# Patient Record
Sex: MALE | Race: WHITE | Employment: OTHER | URBAN - METROPOLITAN AREA
[De-identification: names, ages, dates, MRNs, and addresses within clinical notes are randomized per-mention and may not be internally consistent; named-entity substitution may affect disease eponyms.]

---

## 2017-01-10 ENCOUNTER — OFFICE VISIT (OUTPATIENT)
Dept: PODIATRY CLINIC | Facility: CLINIC | Age: 77
End: 2017-01-10

## 2017-01-10 DIAGNOSIS — M06.30 RHEUMATOID NODULE (HCC): Primary | ICD-10-CM

## 2017-01-10 PROCEDURE — 99203 OFFICE O/P NEW LOW 30 MIN: CPT

## 2017-01-10 PROCEDURE — 20600 DRAIN/INJ JOINT/BURSA W/O US: CPT

## 2017-01-10 RX ORDER — BETAMETHASONE SODIUM PHOSPHATE AND BETAMETHASONE ACETATE 3; 3 MG/ML; MG/ML
12 INJECTION, SUSPENSION INTRA-ARTICULAR; INTRALESIONAL; INTRAMUSCULAR; SOFT TISSUE ONCE
Status: COMPLETED | OUTPATIENT
Start: 2017-01-10 | End: 2017-01-10

## 2017-01-10 RX ADMIN — BETAMETHASONE SODIUM PHOSPHATE AND BETAMETHASONE ACETATE 12 MG: 3; 3 INJECTION, SUSPENSION INTRA-ARTICULAR; INTRALESIONAL; INTRAMUSCULAR; SOFT TISSUE at 14:50:00

## 2017-01-10 NOTE — PROGRESS NOTES
Preparred as ordered by Dr. Melonie Camacho. 2 cc's 2 % lidocaine and 1 cc of celestone. Consent signed by patient. Medication administration per Dr. Melonie Camacho.  Leila Pool RN

## 2017-01-10 NOTE — PROGRESS NOTES
HPI:    Patient ID: Ingris Hansen is a 64year old male. The patient has come to the office with a c/o toe pain. He indicates that he was seen by us 7 years ago. HPI     1. Left 2nd great toe pain  This is a recurrent problem.  The current episode s Use: Yes                Comment: erma               Current Outpatient Prescriptions:  Cefdinir 300 MG Oral Cap Take 1 capsule (300 mg total) by mouth 2 (two) times daily.  Disp: 20 capsule Rfl: 0   Fluticasone Furoate-Vilanterol (BREO ELLIPTA) 100-25 MCG Constitutional: He appears well-developed and well-nourished. No distress. HENT:   Head: Normocephalic and atraumatic. Eyes: EOM are normal.   Neck: Normal range of motion. Pulmonary/Chest: Effort normal. No respiratory distress.    Musculoskeletal:

## 2017-01-13 ENCOUNTER — LAB ENCOUNTER (OUTPATIENT)
Dept: LAB | Age: 77
End: 2017-01-13
Attending: INTERNAL MEDICINE
Payer: MEDICARE

## 2017-01-13 ENCOUNTER — OFFICE VISIT (OUTPATIENT)
Dept: RHEUMATOLOGY | Facility: CLINIC | Age: 77
End: 2017-01-13

## 2017-01-13 VITALS
WEIGHT: 139.38 LBS | SYSTOLIC BLOOD PRESSURE: 128 MMHG | DIASTOLIC BLOOD PRESSURE: 69 MMHG | HEIGHT: 66 IN | BODY MASS INDEX: 22.4 KG/M2

## 2017-01-13 DIAGNOSIS — M06.09 RHEUMATOID ARTHRITIS OF MULTIPLE SITES WITH NEGATIVE RHEUMATOID FACTOR (HCC): ICD-10-CM

## 2017-01-13 DIAGNOSIS — Z51.81 ENCOUNTER FOR THERAPEUTIC DRUG MONITORING: ICD-10-CM

## 2017-01-13 DIAGNOSIS — M05.79 SEROPOSITIVE RHEUMATOID ARTHRITIS OF MULTIPLE SITES (HCC): ICD-10-CM

## 2017-01-13 DIAGNOSIS — J84.10 PULMONARY FIBROSIS (HCC): ICD-10-CM

## 2017-01-13 DIAGNOSIS — M31.0 LEUKOCYTOCLASTIC VASCULITIS (HCC): ICD-10-CM

## 2017-01-13 DIAGNOSIS — M06.09 RHEUMATOID ARTHRITIS OF MULTIPLE SITES WITH NEGATIVE RHEUMATOID FACTOR (HCC): Primary | ICD-10-CM

## 2017-01-13 LAB
ALBUMIN SERPL BCP-MCNC: 4 G/DL (ref 3.5–4.8)
AST SERPL-CCNC: 26 U/L (ref 15–41)
BASOPHILS # BLD: 0.1 K/UL (ref 0–0.2)
BASOPHILS NFR BLD: 1 %
CREAT SERPL-MCNC: 1.49 MG/DL (ref 0.5–1.5)
CRP SERPL-MCNC: 0.8 MG/DL (ref 0–0.9)
EOSINOPHIL # BLD: 0.2 K/UL (ref 0–0.7)
EOSINOPHIL NFR BLD: 2 %
ERYTHROCYTE [DISTWIDTH] IN BLOOD BY AUTOMATED COUNT: 16.3 % (ref 11–15)
ERYTHROCYTE [SEDIMENTATION RATE] IN BLOOD: 20 MM/HR (ref 0–20)
HCT VFR BLD AUTO: 37.9 % (ref 41–52)
HGB BLD-MCNC: 12.2 G/DL (ref 13.5–17.5)
LYMPHOCYTES # BLD: 1 K/UL (ref 1–4)
LYMPHOCYTES NFR BLD: 9 %
MCH RBC QN AUTO: 30.5 PG (ref 27–32)
MCHC RBC AUTO-ENTMCNC: 32.2 G/DL (ref 32–37)
MCV RBC AUTO: 94.7 FL (ref 80–100)
MONOCYTES # BLD: 1 K/UL (ref 0–1)
MONOCYTES NFR BLD: 10 %
NEUTROPHILS # BLD AUTO: 8.1 K/UL (ref 1.8–7.7)
NEUTROPHILS NFR BLD: 78 %
PLATELET # BLD AUTO: 198 K/UL (ref 140–400)
PMV BLD AUTO: 8.3 FL (ref 7.4–10.3)
RBC # BLD AUTO: 4.01 M/UL (ref 4.5–5.9)
WBC # BLD AUTO: 10.3 K/UL (ref 4–11)

## 2017-01-13 PROCEDURE — 82040 ASSAY OF SERUM ALBUMIN: CPT

## 2017-01-13 PROCEDURE — 85025 COMPLETE CBC W/AUTO DIFF WBC: CPT

## 2017-01-13 PROCEDURE — 84450 TRANSFERASE (AST) (SGOT): CPT

## 2017-01-13 PROCEDURE — 99214 OFFICE O/P EST MOD 30 MIN: CPT | Performed by: INTERNAL MEDICINE

## 2017-01-13 PROCEDURE — 82565 ASSAY OF CREATININE: CPT

## 2017-01-13 PROCEDURE — 85652 RBC SED RATE AUTOMATED: CPT

## 2017-01-13 PROCEDURE — G0463 HOSPITAL OUTPT CLINIC VISIT: HCPCS | Performed by: INTERNAL MEDICINE

## 2017-01-13 PROCEDURE — 86140 C-REACTIVE PROTEIN: CPT

## 2017-01-13 PROCEDURE — 36415 COLL VENOUS BLD VENIPUNCTURE: CPT

## 2017-01-13 RX ORDER — HYDROCODONE BITARTRATE AND ACETAMINOPHEN 5; 325 MG/1; MG/1
1 TABLET ORAL EVERY 6 HOURS PRN
Qty: 90 TABLET | Refills: 0 | Status: SHIPPED | OUTPATIENT
Start: 2017-01-13 | End: 2017-05-05

## 2017-01-13 NOTE — PROGRESS NOTES
HPI:    Patient ID: Flako Ortega is a 64year old male.     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 Disp: 180 tablet Rfl: 3   Albuterol Sulfate  (90 BASE) MCG/ACT Inhalation Aero Soln Inhale 2 puffs into the lungs every 6 (six) hours as needed for Wheezing.  Disp: 1 Inhaler Rfl: 2   Metoprolol Tartrate (LOPRESSOR) 50 MG O both knees without effusions. Lymphadenopathy:     He has no cervical adenopathy. Neurological: He is alert and oriented to person, place, and time. Skin: No rash noted. Psychiatric: He has a normal mood and affect.               ASSESSMENT/PLAN:

## 2017-01-17 ENCOUNTER — OFFICE VISIT (OUTPATIENT)
Dept: PODIATRY CLINIC | Facility: CLINIC | Age: 77
End: 2017-01-17

## 2017-01-17 DIAGNOSIS — M06.30 RHEUMATOID NODULE (HCC): Primary | ICD-10-CM

## 2017-01-17 PROCEDURE — 99213 OFFICE O/P EST LOW 20 MIN: CPT

## 2017-01-17 NOTE — PROGRESS NOTES
HPI:    Patient ID: Ingris Hansen is a 64year old male. HPI     1. Left 2nd great toe pain  This is a recurrent problem. The current episode started more than 1 year ago. The problem occurs intermittently. The problem has been gradually worsening.  Per Outpatient Prescriptions:  HYDROcodone-acetaminophen 5-325 MG Oral Tab Take 1 tablet by mouth every 6 (six) hours as needed for Pain. Disp: 90 tablet Rfl: 0   Cefdinir 300 MG Oral Cap Take 1 capsule (300 mg total) by mouth 2 (two) times daily.  Disp: 20 cap well-nourished. No distress. HENT:   Head: Normocephalic and atraumatic. Eyes: EOM are normal.   Neck: Normal range of motion. Pulmonary/Chest: Effort normal. No respiratory distress. Musculoskeletal: Normal range of motion.    Left 2nd great toe -

## 2017-02-14 ENCOUNTER — OFFICE VISIT (OUTPATIENT)
Dept: CARDIOLOGY CLINIC | Facility: CLINIC | Age: 77
End: 2017-02-14

## 2017-02-14 VITALS
DIASTOLIC BLOOD PRESSURE: 58 MMHG | WEIGHT: 140 LBS | HEIGHT: 65 IN | BODY MASS INDEX: 23.32 KG/M2 | SYSTOLIC BLOOD PRESSURE: 110 MMHG | RESPIRATION RATE: 18 BRPM | HEART RATE: 75 BPM

## 2017-02-14 DIAGNOSIS — I25.10 CORONARY ARTERY DISEASE INVOLVING NATIVE CORONARY ARTERY OF NATIVE HEART WITHOUT ANGINA PECTORIS: Primary | ICD-10-CM

## 2017-02-14 DIAGNOSIS — E78.00 HYPERCHOLESTEREMIA: ICD-10-CM

## 2017-02-14 DIAGNOSIS — I10 HTN (HYPERTENSION), BENIGN: ICD-10-CM

## 2017-02-14 PROCEDURE — G0463 HOSPITAL OUTPT CLINIC VISIT: HCPCS | Performed by: INTERNAL MEDICINE

## 2017-02-14 PROCEDURE — 99214 OFFICE O/P EST MOD 30 MIN: CPT | Performed by: INTERNAL MEDICINE

## 2017-02-14 RX ORDER — LISINOPRIL 10 MG/1
10 TABLET ORAL DAILY
Qty: 90 TABLET | Refills: 3 | Status: SHIPPED | OUTPATIENT
Start: 2017-02-14 | End: 2017-12-04

## 2017-02-14 RX ORDER — LOVASTATIN 20 MG/1
20 TABLET ORAL NIGHTLY
Qty: 90 TABLET | Refills: 3 | Status: SHIPPED | OUTPATIENT
Start: 2017-02-14

## 2017-02-14 RX ORDER — METOPROLOL TARTRATE 50 MG/1
50 TABLET, FILM COATED ORAL 2 TIMES DAILY
Qty: 180 TABLET | Refills: 3 | Status: SHIPPED | OUTPATIENT
Start: 2017-02-14 | End: 2017-12-04

## 2017-02-14 NOTE — PROGRESS NOTES
Amado Gale is a 64year old male. Patient presents with:   Follow - Up  CAD  Hypercholesterolemia    HPI:   This is a pleasant 41-year-old with coronary disease prior stent in 2006 hypertension elevated cholesterol arthritis and recent diagnosis of pulmo nightly. Disp: 90 tablet Rfl: 3   Probiotic Product (SOLUBLE FIBER/PROBIOTICS OR) Take 1 tablet by mouth daily. Disp:  Rfl:    Ascorbic Acid (VITAMIN C OR) Take 1 tablet by mouth daily.    Disp:  Rfl:    Calcium Carbonate (CALCIUM 500 OR) Take 1 tablet by Addressed This Visit     CAD (coronary artery disease) - Primary    Relevant Orders    ALT (SGPT)    AST (SGOT)    LIPID PANEL    Hypercholesteremia    HTN (hypertension), benign          In conclusion this is a pleasant 40-year-old with coronary disease h

## 2017-03-21 ENCOUNTER — OFFICE VISIT (OUTPATIENT)
Dept: PULMONOLOGY | Facility: CLINIC | Age: 77
End: 2017-03-21

## 2017-03-21 VITALS
WEIGHT: 139.38 LBS | HEIGHT: 66 IN | SYSTOLIC BLOOD PRESSURE: 117 MMHG | BODY MASS INDEX: 22.4 KG/M2 | HEART RATE: 78 BPM | OXYGEN SATURATION: 97 % | DIASTOLIC BLOOD PRESSURE: 67 MMHG | RESPIRATION RATE: 18 BRPM

## 2017-03-21 DIAGNOSIS — J84.112 UIP (USUAL INTERSTITIAL PNEUMONITIS) (HCC): Primary | ICD-10-CM

## 2017-03-21 PROCEDURE — 99213 OFFICE O/P EST LOW 20 MIN: CPT | Performed by: INTERNAL MEDICINE

## 2017-03-21 PROCEDURE — G0463 HOSPITAL OUTPT CLINIC VISIT: HCPCS | Performed by: INTERNAL MEDICINE

## 2017-03-21 NOTE — PROGRESS NOTES
Referring Physician  Abhay Hinkle MD    History of Present Illness  Patient is a 77-year-old  male who presents to pulmonary clinic for follow-up visit.   Over the course the last 6 months he states that his dyspnea with exertion is actually i daily Disp: 90 tablet Rfl: 3   Probiotic Product (SOLUBLE FIBER/PROBIOTICS OR) Take 1 tablet by mouth daily. Disp:  Rfl:    Ascorbic Acid (VITAMIN C OR) Take 1 tablet by mouth daily.    Disp:  Rfl:    Calcium Carbonate (CALCIUM 500 OR) Take 1 tablet by mo monitor the patient periodically.   -I informed the patient to call our office if his dyspnea worsens or if he develops worsening productive cough with purulent sputum, fevers as I would have a low threshold for aggressively treating him with antibiotic th

## 2017-03-22 RX ORDER — METHOTREXATE 2.5 MG/1
TABLET ORAL
Qty: 32 TABLET | Refills: 5 | Status: SHIPPED | OUTPATIENT
Start: 2017-03-22 | End: 2017-08-11

## 2017-03-22 NOTE — TELEPHONE ENCOUNTER
LOV: 1/13/2017  Future Appointments  Date Time Provider Nette Irwin   5/5/2017 11:00 AM Patti Brewster MD [place]   9/19/2017 11:10 AM Yane Sanchez [facility]     Labs:   Component      Latest Ref Rng 1/13/2017   WBC      4.0-11.0 K/UL 10.3   RBC      4.50-5.90 M/UL 4.01 (L)   Hemoglobin      13.5-17.5 g/dL 12.2 (L)   Hematocrit      41.0-52.0 % 37.9 (L)   MCV      80.0-100.0 fL 94.7   MCH      27.0-32.0 pg 30.5   MCHC      32.0-37.0 g/dl 32.2   RDW      11.0-15.0 % 16.3 (H)   Platelet Count      416-277 K/   MEAN PLATELET VOLUME      7.4-10.3 fL 8.3   Neutrophils %       78   Lymphocytes %       9   Monocytes %       10   Eosinophils %       2   Basophils %       1   Neutrophils Absolute      1.8-7.7 K/UL 8.1 (H)   Lymphocytes Absolute      1.0-4.0 K/UL 1.0   Monocytes Absolute      0.0-1.0 K/UL 1.0   Eosinophils Absolute      0.0-0.7 K/UL 0.2   Basophils Absolute      0.0-0.2 K/UL 0.1   CREATININE      0.50-1.50 mg/dL 1.49   GFR, Non-      >=60 46 (L)   GFR, -American      >=60 55 (L)   Albumin      3.5-4.8 g/dL 4.0   AST (SGOT)      15-41 U/L 26   C-REACTIVE PROTEIN      0.0-0.9 mg/dL 0.8   SED RATE      0-20 mm/Hr 20

## 2017-04-20 ENCOUNTER — APPOINTMENT (OUTPATIENT)
Dept: LAB | Age: 77
End: 2017-04-20
Attending: INTERNAL MEDICINE
Payer: MEDICARE

## 2017-04-20 ENCOUNTER — OFFICE VISIT (OUTPATIENT)
Dept: FAMILY MEDICINE CLINIC | Facility: CLINIC | Age: 77
End: 2017-04-20

## 2017-04-20 ENCOUNTER — TELEPHONE (OUTPATIENT)
Dept: FAMILY MEDICINE CLINIC | Facility: CLINIC | Age: 77
End: 2017-04-20

## 2017-04-20 VITALS
WEIGHT: 134 LBS | OXYGEN SATURATION: 97 % | BODY MASS INDEX: 22 KG/M2 | HEART RATE: 92 BPM | SYSTOLIC BLOOD PRESSURE: 116 MMHG | TEMPERATURE: 97 F | RESPIRATION RATE: 18 BRPM | DIASTOLIC BLOOD PRESSURE: 71 MMHG

## 2017-04-20 DIAGNOSIS — I25.10 CORONARY ARTERY DISEASE INVOLVING NATIVE CORONARY ARTERY OF NATIVE HEART WITHOUT ANGINA PECTORIS: ICD-10-CM

## 2017-04-20 DIAGNOSIS — D84.9 IMMUNOCOMPROMISED PATIENT (HCC): Primary | ICD-10-CM

## 2017-04-20 DIAGNOSIS — J84.10 PULMONARY FIBROSIS (HCC): ICD-10-CM

## 2017-04-20 DIAGNOSIS — J01.01 ACUTE RECURRENT MAXILLARY SINUSITIS: ICD-10-CM

## 2017-04-20 PROCEDURE — 84450 TRANSFERASE (AST) (SGOT): CPT

## 2017-04-20 PROCEDURE — G0463 HOSPITAL OUTPT CLINIC VISIT: HCPCS | Performed by: FAMILY MEDICINE

## 2017-04-20 PROCEDURE — 99213 OFFICE O/P EST LOW 20 MIN: CPT | Performed by: FAMILY MEDICINE

## 2017-04-20 PROCEDURE — 36415 COLL VENOUS BLD VENIPUNCTURE: CPT

## 2017-04-20 PROCEDURE — 80061 LIPID PANEL: CPT

## 2017-04-20 PROCEDURE — 84460 ALANINE AMINO (ALT) (SGPT): CPT

## 2017-04-20 RX ORDER — AMOXICILLIN AND CLAVULANATE POTASSIUM 875; 125 MG/1; MG/1
1 TABLET, FILM COATED ORAL 2 TIMES DAILY
Qty: 20 TABLET | Refills: 0 | Status: SHIPPED | OUTPATIENT
Start: 2017-04-20 | End: 2017-11-03

## 2017-04-20 NOTE — PROGRESS NOTES
HPI:    Patient ID: Ruel Ours is a 76year old male. Patient presents with:  Sinus Problem  Cough  Breathing Problem    HPI  Long hx recurrent sinusitis, was worse in past  Is immunocompromised due to Rx for RA incl methotrexate and prednisone.   Celestino FIBER/PROBIOTICS OR) Take 1 tablet by mouth daily. Disp:  Rfl:    Ascorbic Acid (VITAMIN C OR) Take 1 tablet by mouth daily. Disp:  Rfl:    Calcium Carbonate (CALCIUM 500 OR) Take 1 tablet by mouth daily.    Disp:  Rfl:    Biotin (BIOTIN 5000) 5 MG Oral EMILEE#[id number]

## 2017-04-20 NOTE — TELEPHONE ENCOUNTER
Reviewed pharmacist's question with Dr. Jyothi Perez. Per his instructions contacted pharmacist, Jackelyn Velasco, and advised her pt should not take Methotrexate for 10 days while he is taking the Augmentin, she will inform pt.

## 2017-04-20 NOTE — TELEPHONE ENCOUNTER
Call transferred to RN from [address] [place] [address]. Pharmacist states there is a drug interaction between Augmentin prescribed today and Methotrexate prescribed by another physician. Pharmacist asking if ok to dispense med. Call back number [phone]. Please advise.

## 2017-04-20 NOTE — PROGRESS NOTES
HPI:    Patient ID: Silvia Gaona is a 76year old male.     HPI    Review of Systems         Current Outpatient Prescriptions:  METHOTREXATE 2.5 MG Oral Tab TAKE EIGHT TABLETS BY MOUTH EVERY WEDNESDAY Disp: 32 tablet Rfl: 5   Fluticasone Furoate-Vilantero Rfl:      Allergies:No Known Allergies   PHYSICAL EXAM:   Physical Exam           ASSESSMENT/PLAN:   No diagnosis found. No orders of the defined types were placed in this encounter.        Meds This Visit:  No prescriptions requested or ordered in this

## 2017-05-05 ENCOUNTER — LAB ENCOUNTER (OUTPATIENT)
Dept: LAB | Age: 77
End: 2017-05-05
Attending: INTERNAL MEDICINE
Payer: MEDICARE

## 2017-05-05 ENCOUNTER — OFFICE VISIT (OUTPATIENT)
Dept: RHEUMATOLOGY | Facility: CLINIC | Age: 77
End: 2017-05-05

## 2017-05-05 VITALS
DIASTOLIC BLOOD PRESSURE: 63 MMHG | HEIGHT: 66 IN | SYSTOLIC BLOOD PRESSURE: 105 MMHG | BODY MASS INDEX: 21.69 KG/M2 | TEMPERATURE: 98 F | WEIGHT: 135 LBS | HEART RATE: 82 BPM

## 2017-05-05 DIAGNOSIS — Z51.81 ENCOUNTER FOR THERAPEUTIC DRUG MONITORING: ICD-10-CM

## 2017-05-05 DIAGNOSIS — M05.79 SEROPOSITIVE RHEUMATOID ARTHRITIS OF MULTIPLE SITES (HCC): Primary | ICD-10-CM

## 2017-05-05 DIAGNOSIS — J84.10 PULMONARY FIBROSIS (HCC): ICD-10-CM

## 2017-05-05 DIAGNOSIS — M06.09 RHEUMATOID ARTHRITIS OF MULTIPLE SITES WITH NEGATIVE RHEUMATOID FACTOR (HCC): ICD-10-CM

## 2017-05-05 DIAGNOSIS — M31.0 LEUKOCYTOCLASTIC VASCULITIS (HCC): ICD-10-CM

## 2017-05-05 DIAGNOSIS — N28.9 RENAL INSUFFICIENCY: ICD-10-CM

## 2017-05-05 PROCEDURE — 86140 C-REACTIVE PROTEIN: CPT

## 2017-05-05 PROCEDURE — G0463 HOSPITAL OUTPT CLINIC VISIT: HCPCS | Performed by: INTERNAL MEDICINE

## 2017-05-05 PROCEDURE — 84450 TRANSFERASE (AST) (SGOT): CPT

## 2017-05-05 PROCEDURE — 85007 BL SMEAR W/DIFF WBC COUNT: CPT

## 2017-05-05 PROCEDURE — 85652 RBC SED RATE AUTOMATED: CPT

## 2017-05-05 PROCEDURE — 82040 ASSAY OF SERUM ALBUMIN: CPT

## 2017-05-05 PROCEDURE — 82565 ASSAY OF CREATININE: CPT

## 2017-05-05 PROCEDURE — 36415 COLL VENOUS BLD VENIPUNCTURE: CPT

## 2017-05-05 PROCEDURE — 85027 COMPLETE CBC AUTOMATED: CPT

## 2017-05-05 PROCEDURE — 99214 OFFICE O/P EST MOD 30 MIN: CPT | Performed by: INTERNAL MEDICINE

## 2017-05-05 RX ORDER — HYDROCODONE BITARTRATE AND ACETAMINOPHEN 5; 325 MG/1; MG/1
1 TABLET ORAL EVERY 6 HOURS PRN
Qty: 90 TABLET | Refills: 0 | Status: SHIPPED | OUTPATIENT
Start: 2017-05-05 | End: 2017-08-11

## 2017-05-05 RX ORDER — PREDNISONE 1 MG/1
TABLET ORAL
Qty: 90 TABLET | Refills: 3 | Status: SHIPPED | OUTPATIENT
Start: 2017-05-05 | End: 2017-12-04

## 2017-05-05 NOTE — PROGRESS NOTES
HPI:    Patient ID: Cyrus Ward is a 76year old male.     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 Disp: 1 each Rfl: 5   lisinopril 10 MG Oral Tab Take 1 tablet (10 mg total) by mouth daily.  Disp: 90 tablet Rfl: 3   Lovastatin 20 MG Oral Tab Take 1 tablet (20 mg total) by mouth nightly exhibits no edema. There is not active synovitis in his elbows, wrists, or hands. There is crepitance and discomfort with flexion and extension of his knees, without effusions. Lymphadenopathy:     He has no cervical adenopathy.    Neurological: He is

## 2017-05-30 RX ORDER — VALACYCLOVIR HYDROCHLORIDE 500 MG/1
TABLET, FILM COATED ORAL
Qty: 90 TABLET | Refills: 3 | Status: SHIPPED | OUTPATIENT
Start: 2017-05-30

## 2017-05-30 NOTE — TELEPHONE ENCOUNTER
Dr. Jessie Kate: please review for additional refills.    Refill Protocol Appointment Criteria  · Appointment scheduled in the past 12 months or in the next 3 months  Recent Visits       Provider Department Primary Dx    1 month ago MD Sallie Ibarra

## 2017-08-11 ENCOUNTER — LAB ENCOUNTER (OUTPATIENT)
Dept: LAB | Age: 77
End: 2017-08-11
Attending: INTERNAL MEDICINE
Payer: MEDICARE

## 2017-08-11 ENCOUNTER — OFFICE VISIT (OUTPATIENT)
Dept: RHEUMATOLOGY | Facility: CLINIC | Age: 77
End: 2017-08-11

## 2017-08-11 VITALS
DIASTOLIC BLOOD PRESSURE: 66 MMHG | BODY MASS INDEX: 21.44 KG/M2 | HEART RATE: 83 BPM | WEIGHT: 133.38 LBS | HEIGHT: 66 IN | SYSTOLIC BLOOD PRESSURE: 106 MMHG

## 2017-08-11 DIAGNOSIS — M31.0 LEUKOCYTOCLASTIC VASCULITIS (HCC): ICD-10-CM

## 2017-08-11 DIAGNOSIS — J84.10 PULMONARY FIBROSIS (HCC): ICD-10-CM

## 2017-08-11 DIAGNOSIS — Z51.81 ENCOUNTER FOR THERAPEUTIC DRUG MONITORING: ICD-10-CM

## 2017-08-11 DIAGNOSIS — M05.79 SEROPOSITIVE RHEUMATOID ARTHRITIS OF MULTIPLE SITES (HCC): Primary | ICD-10-CM

## 2017-08-11 DIAGNOSIS — M06.09 RHEUMATOID ARTHRITIS OF MULTIPLE SITES WITH NEGATIVE RHEUMATOID FACTOR (HCC): ICD-10-CM

## 2017-08-11 LAB
ALBUMIN SERPL BCP-MCNC: 3.8 G/DL (ref 3.5–4.8)
AST SERPL-CCNC: 26 U/L (ref 15–41)
BASOPHILS # BLD: 0.1 K/UL (ref 0–0.2)
BASOPHILS NFR BLD: 1 %
CREAT SERPL-MCNC: 1.76 MG/DL (ref 0.5–1.5)
CRP SERPL-MCNC: 7.6 MG/DL (ref 0–0.9)
EOSINOPHIL # BLD: 0.1 K/UL (ref 0–0.7)
EOSINOPHIL NFR BLD: 1 %
ERYTHROCYTE [DISTWIDTH] IN BLOOD BY AUTOMATED COUNT: 17.3 % (ref 11–15)
ERYTHROCYTE [SEDIMENTATION RATE] IN BLOOD: 45 MM/HR (ref 0–20)
HCT VFR BLD AUTO: 34.9 % (ref 41–52)
HGB BLD-MCNC: 11.1 G/DL (ref 13.5–17.5)
LYMPHOCYTES # BLD: 1 K/UL (ref 1–4)
LYMPHOCYTES NFR BLD: 8 %
MCH RBC QN AUTO: 29.5 PG (ref 27–32)
MCHC RBC AUTO-ENTMCNC: 31.7 G/DL (ref 32–37)
MCV RBC AUTO: 93 FL (ref 80–100)
MONOCYTES # BLD: 1.1 K/UL (ref 0–1)
MONOCYTES NFR BLD: 9 %
NEUTROPHILS # BLD AUTO: 9.7 K/UL (ref 1.8–7.7)
NEUTROPHILS NFR BLD: 81 %
PLATELET # BLD AUTO: 225 K/UL (ref 140–400)
PMV BLD AUTO: 7.9 FL (ref 7.4–10.3)
RBC # BLD AUTO: 3.75 M/UL (ref 4.5–5.9)
WBC # BLD AUTO: 11.9 K/UL (ref 4–11)

## 2017-08-11 PROCEDURE — 84450 TRANSFERASE (AST) (SGOT): CPT

## 2017-08-11 PROCEDURE — 99214 OFFICE O/P EST MOD 30 MIN: CPT | Performed by: INTERNAL MEDICINE

## 2017-08-11 PROCEDURE — 86140 C-REACTIVE PROTEIN: CPT

## 2017-08-11 PROCEDURE — 82565 ASSAY OF CREATININE: CPT

## 2017-08-11 PROCEDURE — 85652 RBC SED RATE AUTOMATED: CPT

## 2017-08-11 PROCEDURE — 36415 COLL VENOUS BLD VENIPUNCTURE: CPT

## 2017-08-11 PROCEDURE — 82040 ASSAY OF SERUM ALBUMIN: CPT

## 2017-08-11 PROCEDURE — 85025 COMPLETE CBC W/AUTO DIFF WBC: CPT

## 2017-08-11 PROCEDURE — G0463 HOSPITAL OUTPT CLINIC VISIT: HCPCS | Performed by: INTERNAL MEDICINE

## 2017-08-11 RX ORDER — HYDROXYCHLOROQUINE SULFATE 200 MG/1
200 TABLET, FILM COATED ORAL 2 TIMES DAILY
Qty: 180 TABLET | Refills: 3 | Status: SHIPPED | OUTPATIENT
Start: 2017-08-11

## 2017-08-11 RX ORDER — HYDROCODONE BITARTRATE AND ACETAMINOPHEN 5; 325 MG/1; MG/1
1 TABLET ORAL EVERY 6 HOURS PRN
Qty: 90 TABLET | Refills: 0 | Status: SHIPPED | OUTPATIENT
Start: 2017-08-11 | End: 2017-11-03

## 2017-08-11 NOTE — PROGRESS NOTES
HPI:    Patient ID: Jose Reyes is a 76year old male. 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  Since I saw Furoate-Vilanterol (BREO ELLIPTA) 100-25 MCG/INH Inhalation Aerosol Powder, Breath Activated Inhale 1 puff into the lungs daily. Disp: 1 each Rfl: 5   lisinopril 10 MG Oral Tab Take 1 tablet (10 mg total) by mouth daily.  Disp: 90 tablet Rfl: 3   Lovastatin wrists, hands, or knees. Lymphadenopathy:     He has no cervical adenopathy. Neurological: He is alert and oriented to person, place, and time. Skin: No rash noted. Psychiatric: He has a normal mood and affect. ASSESSMENT/PLAN:     1.

## 2017-09-19 ENCOUNTER — OFFICE VISIT (OUTPATIENT)
Dept: PULMONOLOGY | Facility: CLINIC | Age: 77
End: 2017-09-19

## 2017-09-19 VITALS
HEIGHT: 67 IN | RESPIRATION RATE: 18 BRPM | DIASTOLIC BLOOD PRESSURE: 60 MMHG | SYSTOLIC BLOOD PRESSURE: 112 MMHG | BODY MASS INDEX: 20.09 KG/M2 | WEIGHT: 128 LBS | HEART RATE: 91 BPM | OXYGEN SATURATION: 98 %

## 2017-09-19 DIAGNOSIS — J84.9 ILD (INTERSTITIAL LUNG DISEASE) (HCC): Primary | ICD-10-CM

## 2017-09-19 PROCEDURE — G0463 HOSPITAL OUTPT CLINIC VISIT: HCPCS | Performed by: INTERNAL MEDICINE

## 2017-09-19 PROCEDURE — 99213 OFFICE O/P EST LOW 20 MIN: CPT | Performed by: INTERNAL MEDICINE

## 2017-09-19 NOTE — PROGRESS NOTES
Referring Physician  Vivian Cortez MD    History of Present Illness  Patient is a 64-year-old  male who presents to pulmonary clinic for follow-up visit. Over the course of the last several months he denies any significant worsening dyspnea.   He do 2   aspirin 325 MG Oral Tab Take 325 mg by mouth daily. Disp:  Rfl:    Probiotic Product (SOLUBLE FIBER/PROBIOTICS OR) Take 1 tablet by mouth daily. Disp:  Rfl:    Ascorbic Acid (VITAMIN C OR) Take 1 tablet by mouth daily.    Disp:  Rfl:    Calcium Carbon therapy.  -Patient states that he will attempt to have his lisinopril changed to other class of medication if it may be contributing to his nonproductive cough. I recommended to start PPI therapy but he states he will hold off at this time.     Follow Up

## 2017-10-13 ENCOUNTER — HOSPITAL ENCOUNTER (OUTPATIENT)
Dept: CT IMAGING | Facility: HOSPITAL | Age: 77
Discharge: HOME | End: 2017-10-13
Attending: INTERNAL MEDICINE
Payer: MEDICARE

## 2017-10-13 DIAGNOSIS — J84.9 ILD (INTERSTITIAL LUNG DISEASE) (HCC): ICD-10-CM

## 2017-10-13 PROCEDURE — 71250 CT THORAX DX C-: CPT | Performed by: INTERNAL MEDICINE

## 2017-11-01 NOTE — PROGRESS NOTES
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  Since I saw him 3 months ago he has continued methotrexate 20 mg weekly, Prescriptions:  VALACYCLOVIR  MG Oral Tab TAKE 1 TABLET  BY MOUTH ONCE DAILY.  Disp: 90 tablet Rfl: 3   predniSONE 5 MG Oral Tab Take one daily Disp: 90 tablet Rfl: 3   HYDROcodone-acetaminophen 5-325 MG Oral Tab Take 1 tablet by mouth every 6 (six) and time. Chronically-ill appearing. HENT:   Head: Normocephalic. Eyes: Conjunctivae are normal.   Cardiovascular: Normal rate and regular rhythm. Pulmonary/Chest: Effort normal.   Diffuse crackles, rhonchi. Abdominal: Soft.  Bowel sounds are nor

## 2017-11-03 ENCOUNTER — LAB ENCOUNTER (OUTPATIENT)
Dept: LAB | Age: 77
End: 2017-11-03
Attending: INTERNAL MEDICINE
Payer: MEDICARE

## 2017-11-03 ENCOUNTER — OFFICE VISIT (OUTPATIENT)
Dept: RHEUMATOLOGY | Facility: CLINIC | Age: 77
End: 2017-11-03

## 2017-11-03 VITALS
SYSTOLIC BLOOD PRESSURE: 119 MMHG | TEMPERATURE: 97 F | BODY MASS INDEX: 18.68 KG/M2 | WEIGHT: 119 LBS | HEART RATE: 120 BPM | DIASTOLIC BLOOD PRESSURE: 72 MMHG | HEIGHT: 67 IN

## 2017-11-03 DIAGNOSIS — M06.09 RHEUMATOID ARTHRITIS OF MULTIPLE SITES WITH NEGATIVE RHEUMATOID FACTOR (HCC): ICD-10-CM

## 2017-11-03 DIAGNOSIS — Z51.81 ENCOUNTER FOR THERAPEUTIC DRUG MONITORING: ICD-10-CM

## 2017-11-03 DIAGNOSIS — M31.0 LEUKOCYTOCLASTIC VASCULITIS (HCC): ICD-10-CM

## 2017-11-03 DIAGNOSIS — J47.9 BRONCHIECTASIS WITHOUT COMPLICATION (HCC): ICD-10-CM

## 2017-11-03 DIAGNOSIS — J84.10 PULMONARY FIBROSIS (HCC): ICD-10-CM

## 2017-11-03 DIAGNOSIS — M05.79 SEROPOSITIVE RHEUMATOID ARTHRITIS OF MULTIPLE SITES (HCC): Primary | ICD-10-CM

## 2017-11-03 PROCEDURE — G0463 HOSPITAL OUTPT CLINIC VISIT: HCPCS | Performed by: INTERNAL MEDICINE

## 2017-11-03 PROCEDURE — 99214 OFFICE O/P EST MOD 30 MIN: CPT | Performed by: INTERNAL MEDICINE

## 2017-11-03 PROCEDURE — 85652 RBC SED RATE AUTOMATED: CPT

## 2017-11-03 PROCEDURE — 85025 COMPLETE CBC W/AUTO DIFF WBC: CPT

## 2017-11-03 PROCEDURE — 36415 COLL VENOUS BLD VENIPUNCTURE: CPT

## 2017-11-03 PROCEDURE — 82040 ASSAY OF SERUM ALBUMIN: CPT

## 2017-11-03 PROCEDURE — 84450 TRANSFERASE (AST) (SGOT): CPT

## 2017-11-03 PROCEDURE — 82565 ASSAY OF CREATININE: CPT

## 2017-11-03 PROCEDURE — 86140 C-REACTIVE PROTEIN: CPT

## 2017-11-03 RX ORDER — PREDNISONE 1 MG/1
TABLET ORAL
Qty: 100 TABLET | Refills: 2 | Status: SHIPPED | OUTPATIENT
Start: 2017-11-03 | End: 2017-12-04

## 2017-11-03 RX ORDER — HYDROCODONE BITARTRATE AND ACETAMINOPHEN 5; 325 MG/1; MG/1
1 TABLET ORAL EVERY 6 HOURS PRN
Qty: 90 TABLET | Refills: 0 | Status: SHIPPED | OUTPATIENT
Start: 2017-11-03 | End: 2017-12-04

## 2017-11-03 RX ORDER — AMOXICILLIN AND CLAVULANATE POTASSIUM 875; 125 MG/1; MG/1
1 TABLET, FILM COATED ORAL 2 TIMES DAILY
Qty: 20 TABLET | Refills: 0 | Status: SHIPPED | OUTPATIENT
Start: 2017-11-03 | End: 2017-11-13

## 2017-11-03 RX ORDER — HYDROCODONE BITARTRATE AND ACETAMINOPHEN 5; 325 MG/1; MG/1
1 TABLET ORAL EVERY 6 HOURS PRN
Qty: 90 TABLET | Refills: 0 | Status: ON HOLD | OUTPATIENT
Start: 2017-11-03 | End: 2017-12-04

## 2017-11-28 ENCOUNTER — TELEPHONE (OUTPATIENT)
Dept: PULMONOLOGY | Facility: CLINIC | Age: 77
End: 2017-11-28

## 2017-11-28 NOTE — TELEPHONE ENCOUNTER
Pt called to speak to RN about new order for oxygen. Pt has been having problems especially in AM with shortness of breath. Please call.

## 2017-11-28 NOTE — TELEPHONE ENCOUNTER
I would like the patient to be seen in the clinic this Thursday if possible. It is okay to double book from my standpoint. I would agree and if his dyspnea worsens considerably he should go to the ER.   I would keep him on the same dose of prednisone at t

## 2017-11-28 NOTE — TELEPHONE ENCOUNTER
Informed pt of Dr. Dionne Castro orders. Sched pt on 11/30 @ 2:10 pm at [facility]. Pt given appt time, location, & parking. He verbalized understanding.

## 2017-11-28 NOTE — TELEPHONE ENCOUNTER
Pt c/o HARDWICK, SOB, & wheezing onset 1 wk. He denies dyspnea at rest or any other symptoms. Pt stts he has never had O2, but he feels like he needs O2 (no pulse oximeter). He stts he is taking prednisone 5 mg daily prescribed by Dr. Arias Pritchett.  Pt stts NWIX

## 2017-11-29 ENCOUNTER — HOSPITAL ENCOUNTER (INPATIENT)
Facility: HOSPITAL | Age: 77
LOS: 5 days | Discharge: NURSING, REHAB OR LONG-TERM CARE | DRG: 177 | End: 2017-12-04
Attending: EMERGENCY MEDICINE | Admitting: HOSPITALIST
Payer: MEDICARE

## 2017-11-29 ENCOUNTER — APPOINTMENT (OUTPATIENT)
Dept: GENERAL RADIOLOGY | Facility: HOSPITAL | Age: 77
DRG: 177 | End: 2017-11-29
Attending: EMERGENCY MEDICINE
Payer: MEDICARE

## 2017-11-29 ENCOUNTER — APPOINTMENT (OUTPATIENT)
Dept: CT IMAGING | Facility: HOSPITAL | Age: 77
DRG: 177 | End: 2017-11-29
Attending: EMERGENCY MEDICINE
Payer: MEDICARE

## 2017-11-29 DIAGNOSIS — J84.10 PULMONARY FIBROSIS (HCC): ICD-10-CM

## 2017-11-29 DIAGNOSIS — J18.9 PNEUMONIA OF BOTH LUNGS DUE TO INFECTIOUS ORGANISM, UNSPECIFIED PART OF LUNG: Primary | ICD-10-CM

## 2017-11-29 DIAGNOSIS — J98.2 PNEUMOMEDIASTINUM (HCC): ICD-10-CM

## 2017-11-29 PROCEDURE — 99223 1ST HOSP IP/OBS HIGH 75: CPT | Performed by: INTERNAL MEDICINE

## 2017-11-29 PROCEDURE — 71250 CT THORAX DX C-: CPT | Performed by: EMERGENCY MEDICINE

## 2017-11-29 PROCEDURE — 71010 XR CHEST AP PORTABLE  (CPT=71010): CPT | Performed by: EMERGENCY MEDICINE

## 2017-11-29 PROCEDURE — 99223 1ST HOSP IP/OBS HIGH 75: CPT | Performed by: HOSPITALIST

## 2017-11-29 PROCEDURE — 3E0F76Z INTRODUCTION OF NUTRITIONAL SUBSTANCE INTO RESPIRATORY TRACT, VIA NATURAL OR ARTIFICIAL OPENING: ICD-10-PCS | Performed by: HOSPITALIST

## 2017-11-29 RX ORDER — SODIUM CHLORIDE 0.9 % (FLUSH) 0.9 %
3 SYRINGE (ML) INJECTION AS NEEDED
Status: DISCONTINUED | OUTPATIENT
Start: 2017-11-29 | End: 2017-12-04

## 2017-11-29 RX ORDER — HYDROCODONE POLISTIREX AND CHLORPHENIRAMINE POLISTIREX 10; 8 MG/5ML; MG/5ML
5 SUSPENSION, EXTENDED RELEASE ORAL 2 TIMES DAILY PRN
Status: DISCONTINUED | OUTPATIENT
Start: 2017-11-29 | End: 2017-12-04

## 2017-11-29 RX ORDER — ACETAMINOPHEN 325 MG/1
650 TABLET ORAL EVERY 6 HOURS PRN
Status: DISCONTINUED | OUTPATIENT
Start: 2017-11-29 | End: 2017-12-04

## 2017-11-29 RX ORDER — HEPARIN SODIUM 5000 [USP'U]/ML
5000 INJECTION, SOLUTION INTRAVENOUS; SUBCUTANEOUS EVERY 12 HOURS SCHEDULED
Status: DISCONTINUED | OUTPATIENT
Start: 2017-11-29 | End: 2017-12-04

## 2017-11-29 RX ORDER — SODIUM CHLORIDE 9 MG/ML
INJECTION, SOLUTION INTRAVENOUS CONTINUOUS
Status: DISCONTINUED | OUTPATIENT
Start: 2017-11-29 | End: 2017-12-01

## 2017-11-29 RX ORDER — GUAIFENESIN 100 MG/5ML
200 SOLUTION ORAL EVERY 4 HOURS PRN
Status: DISCONTINUED | OUTPATIENT
Start: 2017-11-29 | End: 2017-12-04

## 2017-11-29 RX ORDER — METHYLPREDNISOLONE SODIUM SUCCINATE 40 MG/ML
40 INJECTION, POWDER, LYOPHILIZED, FOR SOLUTION INTRAMUSCULAR; INTRAVENOUS EVERY 6 HOURS
Status: DISCONTINUED | OUTPATIENT
Start: 2017-11-30 | End: 2017-11-30

## 2017-11-29 RX ORDER — METHYLPREDNISOLONE SODIUM SUCCINATE 125 MG/2ML
60 INJECTION, POWDER, LYOPHILIZED, FOR SOLUTION INTRAMUSCULAR; INTRAVENOUS ONCE
Status: COMPLETED | OUTPATIENT
Start: 2017-11-29 | End: 2017-11-29

## 2017-11-29 RX ORDER — ONDANSETRON 2 MG/ML
4 INJECTION INTRAMUSCULAR; INTRAVENOUS EVERY 6 HOURS PRN
Status: DISCONTINUED | OUTPATIENT
Start: 2017-11-29 | End: 2017-12-04

## 2017-11-29 RX ORDER — ALBUTEROL SULFATE 2.5 MG/3ML
2.5 SOLUTION RESPIRATORY (INHALATION) ONCE
Status: COMPLETED | OUTPATIENT
Start: 2017-11-29 | End: 2017-11-29

## 2017-11-29 RX ORDER — IPRATROPIUM BROMIDE AND ALBUTEROL SULFATE 2.5; .5 MG/3ML; MG/3ML
3 SOLUTION RESPIRATORY (INHALATION) EVERY 6 HOURS PRN
Status: DISCONTINUED | OUTPATIENT
Start: 2017-11-29 | End: 2017-12-04

## 2017-11-30 PROCEDURE — 99232 SBSQ HOSP IP/OBS MODERATE 35: CPT | Performed by: INTERNAL MEDICINE

## 2017-11-30 PROCEDURE — 99233 SBSQ HOSP IP/OBS HIGH 50: CPT | Performed by: HOSPITALIST

## 2017-11-30 RX ORDER — METHYLPREDNISOLONE SODIUM SUCCINATE 40 MG/ML
40 INJECTION, POWDER, LYOPHILIZED, FOR SOLUTION INTRAMUSCULAR; INTRAVENOUS EVERY 8 HOURS
Status: DISCONTINUED | OUTPATIENT
Start: 2017-11-30 | End: 2017-12-01

## 2017-11-30 RX ORDER — MELATONIN
100 DAILY
Status: DISCONTINUED | OUTPATIENT
Start: 2017-11-30 | End: 2017-12-04

## 2017-11-30 RX ORDER — BENZONATATE 100 MG/1
100 CAPSULE ORAL 3 TIMES DAILY
Status: DISCONTINUED | OUTPATIENT
Start: 2017-11-30 | End: 2017-12-04

## 2017-11-30 NOTE — CONSULTS
[facility]    Report of Consultation    Alenajacqui Fernandezeugenia Patient Status:  Inpatient    1940 MRN [id number]   Location [facility] 2W/SW Attending Katharina Hawkins MD   Hosp Day # 0 PCP Jacquelyn Jaeger MD     Date of Admission:   [facility]) 2009   • Visual impairment        Past Surgical History  Past Surgical History:  2010: CATARACT      Comment: catract removed right eye   3-01-11: ELECTROCARDIOGRAM, COMPLETE      Comment: SCANNED INTO MEDIA TAB  No date: HC INJECT SINGLE TENDON SHEA Oral Tab Take 1 tablet (200 mg total) by mouth 2 (two) times daily. VALACYCLOVIR  MG Oral Tab TAKE 1 TABLET  BY MOUTH ONCE DAILY. predniSONE 5 MG Oral Tab Take one daily   lisinopril 10 MG Oral Tab Take 1 tablet (10 mg total) by mouth daily.    L CO2 24 11/29/2017    (H) 11/29/2017   CA 9.3 11/29/2017   ALB 3.5 11/03/2017   TP 7.0 02/03/2014   AST 31 11/03/2017   ALT 25 04/20/2017   ESRML 60 (H) 11/03/2017   CRP 7.5 (H) 11/03/2017   TROP 0.05 (HH) 11/29/2017         Imaging:  Ct Chest (cpt pulmonary fibrosis secondary to severe case of rheumatoid arthritis  On CT with significant honeycombing and advanced fibrosis  This is looks like UIP  likely related to advanced severe rheumatoid arthritis    Seems to be Progressive on serial CTs in the l

## 2017-11-30 NOTE — H&P
[facility]    PATIENT'S NAME: Neoma Osler   ATTENDING PHYSICIAN: Jolynn Boyd MD   PATIENT ACCOUNT#:   [id number]    LOCATION:  [place] [address]  MEDICAL RECORD #:   [id number]       YOB: 1940  ADMISSION DATE:       11/29/2 coronary atherosclerosis, chronic renal insufficiency stage 2, anemia of chronic inflammatory disease. PAST SURGICAL HISTORY:  Lumbar laminectomy and cataract surgery.     MEDICATIONS:  Please see medication reconciliation list.     ALLERGIES:  No known Mild acute on chronic renal failure with dehydration. 5.   Mild hyperkalemia. The patient will be admitted to general medical floor, IV Zosyn, IV fluids, hold ACE inhibitors, apply IV Solu-Medrol and nebulizer treatments.   Monitor his hemodynamic and

## 2017-11-30 NOTE — CM/SW NOTE
+BPCI    Patient is a 76year old male with a history of Pulmonary Fibrosis. He is enrolled in the Medicare BPCI program under  (Respiratory). BPCI letter and brochure provided.     [address] [id number]

## 2017-11-30 NOTE — SLP NOTE
ADULT SWALLOWING EVALUATION    ASSESSMENT    ASSESSMENT/OVERALL IMPRESSION:  Pt seen sitting upright in chair for all PO trials. Pt with good oral acceptance and bilabial seal across all trials. No anterior loss of bolus on any consistency given.  Bolus for atherosclerosis of unspecified type of vessel, native or graft    • Displacement of intervertebral disc 1980    PER NG LAMINECTOMY   • High cholesterol    • Hypersensitivity angiitis, unspecified    • Impotence of organic origin    • Other and unspecified and thin liquids without overt signs or symptoms of aspiration with 100 % accuracy over 2 session(s).   In Progress   Goal #2 The patient/family/caregiver will demonstrate understanding and implementation of aspiration precautions and swallow strategies ind

## 2017-11-30 NOTE — PROGRESS NOTES
[facility] - [facility]    Progress Note    Nani Conner Patient Status:  Inpatient    1940 MRN [id number]   Location [facility] 2W/SW Attending Vijay Singleton MD   Hosp Day # 1 PCP Matt Hassan MD       Subjective:     Appetite improv superimposed infectious/inflammatory process. Continued followup is suggested. 3. Cardiomegaly with coronary atherosclerosis. 4. Mild bilateral gynecomastia. 5. Lesser incidental findings as above.          Xr Chest Ap Portable  (cpt=71010)    Result Date: rheumatology  Chronically on prednisone 5 mg  Now on IV steroid     History of leukocytoclastic vasculitis    dvt proph:   heparin    Code status:   DNR    >35 minutes spent     Sujatha Vitale MD  11/30/2017

## 2017-11-30 NOTE — PLAN OF CARE
CARDIOVASCULAR - ADULT    • Maintains optimal cardiac output and hemodynamic stability Progressing    • Absence of cardiac arrhythmias or at baseline Progressing        Impaired Swallowing    • Minimize aspiration risk Progressing        PAIN - ADULT    •

## 2017-11-30 NOTE — DIETARY NOTE
ADULT NUTRITION INITIAL ASSESSMENT    Pt is at high nutrition risk. Pt meets malnutrition criteria. RECOMMENDATIONS TO MD:   RD initiated Oral Nutritional Supplements (ONS) to maximize po and Ordered Thiamine po daily.   Recommended to lift cardiac di documented. ANTHROPOMETRICS:  HT: 167.6 cm (5' 6\")       WT: 49.3 kg (108 lb 9.6 oz)  BMI: Body mass index is 17.53 kg/m².          BMI Classification: less than 19 kg/m2 - underweight  IBW: 142#         76% IBW       Usual Body Wt:  128# in 9/9/17; 133 thin liq.    Oral Nutrition Supplements (ONS) QID  Estimated Nutrition needs:   Calories: 1700 calories/day (35 calories per kg/ABW)  Protein: 74 grams protein/day (1.5 grams protein per kg/ABW)    MONITOR AND EVALUATE/NUTRITION GOALS:  - Food and Nutrient

## 2017-11-30 NOTE — PROGRESS NOTES
[facility]     Progress Note        Josef Roa Patient Status:  Inpatient    1940 MRN [id number]   Location [facility] 2W/SW Attending Kalpesh Zimmerman MD   Hosp Day # 1 PCP Kanwal Perkins MD       Subjective:   Patient seen tender  Extremities: no clubbing, cyanosis, edema  Neurologic: no gross motor deficits  Skin: warm, dry      Results:     Lab Results  Component Value Date   WBC 7.3 11/30/2017   HGB 9.1 11/30/2017   HCT 28.3 11/30/2017    11/30/2017   CREATSERUM 1. Interpretation  --------------------------     Ekg 12-lead    Result Date: 11/29/2017  ECG Report  Interpretation  --------------------------       Assessment   1. Acute hypoxemic respiratory failure  2. Pneumomediastinum  3.   Pulmonary fibrosis likely U

## 2017-11-30 NOTE — ED PROVIDER NOTES
Patient Seen in: [facility] AND [facility] Emergency Department    History   Patient presents with:  Dyspnea EDYTA SOB (respiratory)    Stated Complaint: Tachypneic    HPI    15-year-old male with history of pulmonary fibrosis, rheumatoid arthritis, coronary arter Alcohol use: Yes              Comment: erma       Review of Systems    Positive for stated complaint: Tachypneic  Other systems are as noted in HPI. Constitutional and vital signs reviewed.       All o Abnormal; Notable for the following:     Troponin 0.05 (*)     All other components within normal limits   CBC W/ DIFFERENTIAL - Abnormal; Notable for the following:     WBC 16.5 (*)     RBC 3.56 (*)     HGB 10.1 (*)     HCT 31.6 (*)     MCHC 31.9 (*) diagnosis)  Pulmonary fibrosis ([facility] [address]  Pneumomediastinum ([facility] [address]    Disposition:  Admit  11/29/2017  8:48 pm    Follow-up:  No follow-up provider specified.   We recommend that you schedule follow up care with a primary care provider within the next three nina

## 2017-12-01 PROCEDURE — 99232 SBSQ HOSP IP/OBS MODERATE 35: CPT | Performed by: INTERNAL MEDICINE

## 2017-12-01 PROCEDURE — 99233 SBSQ HOSP IP/OBS HIGH 50: CPT | Performed by: HOSPITALIST

## 2017-12-01 RX ORDER — METHYLPREDNISOLONE SODIUM SUCCINATE 40 MG/ML
40 INJECTION, POWDER, LYOPHILIZED, FOR SOLUTION INTRAMUSCULAR; INTRAVENOUS EVERY 12 HOURS
Status: COMPLETED | OUTPATIENT
Start: 2017-12-01 | End: 2017-12-03

## 2017-12-01 NOTE — PROGRESS NOTES
[facility]    Progress Note    Cyrus Sylvia Patient Status:  Inpatient    1940 MRN [id number]   Location [facility] 5SW/SE Attending Ana Johns, [address] [facility] Day # 2 PCP Lona Jean MD       Subjective:     Pt feels his c suggesting a superimposed infectious/inflammatory process. Continued followup is suggested. 3. Cardiomegaly with coronary atherosclerosis. 4. Mild bilateral gynecomastia. 5. Lesser incidental findings as above.          Xr Chest Ap Portable  (cpt=71010) subcutaneous fat: Severe Depletion  - nutrition consulted  - [facility] soft gen diet  - appetite improved on steroids     Mild hyperkalemia.   - resolved     Severe seropositive rheumatoid arthritis with significant joint deformities  Followed by rheumatology  C

## 2017-12-01 NOTE — CM/SW NOTE
CTL met with the patient and his friend at bedside to explain and enroll in the Medicare BPCI program under  (Respiratory). Patient agreed with phone f/u for 3 months from [address] [place] [address]. BPCI letter and brochure provided.      Patient states he l

## 2017-12-01 NOTE — PLAN OF CARE
Problem: Patient/Family Goals  Goal: Patient/Family Long Term Goal  Patient's Long Term Goal: Breathe better and go home    Interventions:  - Duoneb tx as ordered,  - monitor O2 saturations and activity level  - Administer medications as ordered.   - See ad interventions unsuccessful or patient reports new pain  - Anticipate increased pain with activity and pre-medicate as appropriate   Outcome: Progressing      Problem: Patient Centered Care  Goal: Patient preferences are identified and integrated in the pat Outcome: Progressing      Problem: RESPIRATORY - ADULT  Goal: Achieves optimal ventilation and oxygenation  INTERVENTIONS:  - Assess for changes in respiratory status  - Assess for changes in mentation and behavior  - Position to facilitate oxygenation a

## 2017-12-01 NOTE — PHYSICAL THERAPY NOTE
PHYSICAL THERAPY EVALUATION - INPATIENT     Room Number: 530/530-A  Evaluation Date: 12/1/2017  Type of Evaluation: Initial  Physician Order: PT Eval and Treat    Presenting Problem:  (Pneumomediastinum, Pulmonary fibrosis, H/O RA)  Reason for Therapy: to change his apt since negotiating 24 steps has been getting tough. If pt chooses to go home instead of rehab, discussed with pt that he will need 24 hour assist/supervision upon DC. Per pt, he can stay with his son in his house.  Recommend Home PT and 24 unclear. There is extensive interstitial lung disease.   There is stigmata of interstitial lung disease, coronary fibrosis in usual interstitial pneumonia pattern with extensive bibasilar predominant honeycombing and bronchiectasis, which has slightly prog negotiating stairs was getting hard per pt,    SUBJECTIVE  Pt stated \"I feel weak\"    PHYSICAL THERAPY EXAMINATION     OBJECTIVE     Fall Risk: High fall risk    WEIGHT BEARING RESTRICTION  Weight Bearing Restriction: None                PAIN ASSESSMENT activity tolerated  Gait training  Strengthening  Transfer training  Patient education and PT evaluation    Patient End of Session: Up in chair;Needs met;Call light within reach;RN aware of session/findings; All patient questions and concerns addressed; Fami

## 2017-12-01 NOTE — PROGRESS NOTES
[facility]OS[facility] - [facility]     Progress Note        Trace Duffy Patient Status:  Inpatient    1940 MRN [id number]   Location [facility] 2W/SW Attending Maco Gaitan MD   Hosp Day # 2 PCP Harika Reynoso MD       Subjective:   Patient seen PERRL  Cardio: RRR, S1 S2  Respiratory: End inspiratory crackles present  GI: abdomen soft, non tender  Extremities: no clubbing, cyanosis, edema  Neurologic: no gross motor deficits  Skin: warm, dry      Results:       Lab Results  Component Value Date pleural effusion. Ekg 12-lead    Result Date: 11/29/2017  ECG Report  Interpretation  -------------------------- Sinus Tachycardia -Right bundle branch block.  ABNORMAL When compared with ECG of 11/29/2017 18:03:05 No change Electronically signed

## 2017-12-01 NOTE — PLAN OF CARE
1820- Patient was resting in bed, side rails up, awoken from sleep to order dinner.  Resumed rest.     1840- Patient was found out of bed, calling out, naked, walking towards the bathroom, calling out became very confused as to where he was and how he got t

## 2017-12-01 NOTE — CDS QUERY
Potential for Impaired Nutritional Status  DOCUMENTATION CLARIFICATION FORM  Dear Doctor:  KYLE Nutritional Assessment using ASPEN Guidelines is now in [facility]. Clinical information suggests potential for impaired nutritional status.  For accurate ICD-10-CM cod

## 2017-12-02 PROCEDURE — 99232 SBSQ HOSP IP/OBS MODERATE 35: CPT | Performed by: HOSPITALIST

## 2017-12-02 NOTE — PLAN OF CARE
Problem: Patient/Family Goals  Goal: Patient/Family Long Term Goal  Patient's Long Term Goal: Breathe better and go home    Interventions:  - Duoneb tx as ordered,  - monitor O2 saturations and activity level  - Administer medications as ordered.   - See ad new pain  - Anticipate increased pain with activity and pre-medicate as appropriate   Outcome: Progressing      Problem: Patient Centered Care  Goal: Patient preferences are identified and integrated in the patient's plan of care  Interventions:  - What wo RESPIRATORY - ADULT  Goal: Achieves optimal ventilation and oxygenation  INTERVENTIONS:  - Assess for changes in respiratory status  - Assess for changes in mentation and behavior  - Position to facilitate oxygenation and minimize respiratory effort  - Oxy

## 2017-12-02 NOTE — PROGRESS NOTES
Pulmonary/Critical Care Follow Up Note    HPI:   Francisco Pallas is a 76year old male with Patient presents with:  Dyspnea EDYTA SOB (respiratory)      PCP Selena Warren MD  Admission Attending Mason Pond MD    Hospital Day #3    Less sob  Less cough  Fee Blood pressure 113/65, pulse 112, temperature 98 °F (36.7 °C), temperature source Oral, resp. rate 20, height 5' 6\" (1.676 m), weight 108 lb 9.6 oz (49.3 kg), SpO2 100 %.     Intake/Output Summary (Last 24 hours) at 12/02/17 1226  Last data filed at 12/0

## 2017-12-02 NOTE — PROGRESS NOTES
[facility]    Progress Note    Debra Jeffrey Patient Status:  Inpatient    1940 MRN [id number]   Location [facility] 5SW/SE Attending Carlos Yarbrough MD   Hosp Day # 3 PCP Bushra Cazares MD       Subjective:     Breathing stab steroids     Mild hyperkalemia.   - resolved     Severe seropositive rheumatoid arthritis with significant joint deformities  Followed by rheumatology  Chronically on prednisone 5 mg  Now on IV steroid     History of leukocytoclastic vasculitis     dvt prop

## 2017-12-03 PROCEDURE — 99232 SBSQ HOSP IP/OBS MODERATE 35: CPT | Performed by: HOSPITALIST

## 2017-12-03 PROCEDURE — 99233 SBSQ HOSP IP/OBS HIGH 50: CPT | Performed by: INTERNAL MEDICINE

## 2017-12-03 NOTE — PLAN OF CARE
Problem: Patient/Family Goals  Goal: Patient/Family Long Term Goal  Patient's Long Term Goal: Breathe better and go home    Interventions:  - Duoneb tx as ordered,  - monitor O2 saturations and activity level  - Administer medications as ordered.   - See ad appropriate   Outcome: Progressing      Problem: CARDIOVASCULAR - ADULT  Goal: Maintains optimal cardiac output and hemodynamic stability  INTERVENTIONS:  - Monitor vital signs, rhythm, and trends  - Monitor for bleeding, hypotension and signs of decreased Assess and document skin integrity  - Monitor for areas of redness and/or skin breakdown  - Initiate interventions, skin care algorithm/standards of care as needed   Outcome: Progressing    Goal: Oral mucous membranes remain intact  INTERVENTIONS  - Assess

## 2017-12-03 NOTE — PROGRESS NOTES
[facility]    Progress Note    Luis Bell Patient Status:  Inpatient    1940 MRN [id number]   Location [facility] 5SW/SE Attending Maiad Snow, [facility] Day # 4 PCP Sharyn Gutierrez MD     Subjective:     Constitutional rheumatoid arthritis with significant joint deformities  Followed by rheumatology  Chronically on prednisone 5 mg  Now on IV steroid     4– history of leukocytoclastic vasculitis     5–30 pound weight loss in the last few weeks  Pulmonary cachexia and seco

## 2017-12-03 NOTE — OCCUPATIONAL THERAPY NOTE
OCCUPATIONAL THERAPY EVALUATION - INPATIENT     Room Number: 530/530-A  Evaluation Date: 12/3/2017  Type of Evaluation: Initial  Presenting Problem: pneumonia    Physician Order: IP Consult to Occupational Therapy  Reason for Therapy: ADL/IADL Dysfunction conservation/work simplification techniques;ADL training;Functional transfer training; Endurance training;Patient/Family education;Patient/Family training      OCCUPATIONAL THERAPY MEDICAL/SOCIAL HISTORY     Problem List  Principal Problem:    Pneumonia of ASSESSMENT  Ratin    ACTIVITY TOLERANCE  Poor, pt significantly sob w/ activities    COGNITION  Alert and oriented    RANGE OF MOTION   Upper extremity ROM is within functional limits w/ exception of joint changes/trigger fingers both hands which pt ha transfer with supervision  Comment:     Patient will complete toileting with supervision  Comment:     Patient will tolerate standing for 3 minutes w/ rw and supervision in prep for adls   Comment:    Patient will complete le dressing w/ supervision   Comm

## 2017-12-03 NOTE — PROGRESS NOTES
[place] [facility]    Progress Note    Erendira Nielsen Patient Status:  Inpatient    1940 MRN [id number]   Location [facility] 5SW/SE Attending Cristian Acosta MD   Hosp Day # 4 PCP Bambi Morales MD       Subjective:     Breathing stab nutrition consulted  - [facility] soft gen diet  - appetite improved on steroids     Mild hyperkalemia.   - resolved     Severe seropositive rheumatoid arthritis with significant joint deformities  Followed by rheumatology  Chronically on prednisone 5 mg  Now on

## 2017-12-04 VITALS
RESPIRATION RATE: 20 BRPM | TEMPERATURE: 97 F | HEIGHT: 66 IN | DIASTOLIC BLOOD PRESSURE: 64 MMHG | HEART RATE: 109 BPM | SYSTOLIC BLOOD PRESSURE: 111 MMHG | BODY MASS INDEX: 17.46 KG/M2 | OXYGEN SATURATION: 99 % | WEIGHT: 108.63 LBS

## 2017-12-04 PROCEDURE — 99239 HOSP IP/OBS DSCHRG MGMT >30: CPT | Performed by: HOSPITALIST

## 2017-12-04 PROCEDURE — 99232 SBSQ HOSP IP/OBS MODERATE 35: CPT | Performed by: INTERNAL MEDICINE

## 2017-12-04 RX ORDER — HYDROCODONE BITARTRATE AND ACETAMINOPHEN 5; 325 MG/1; MG/1
1 TABLET ORAL EVERY 6 HOURS PRN
Qty: 20 TABLET | Refills: 0 | Status: SHIPPED | OUTPATIENT
Start: 2017-12-04

## 2017-12-04 RX ORDER — BENZONATATE 100 MG/1
CAPSULE ORAL
Qty: 60 CAPSULE | Refills: 0 | Status: SHIPPED | OUTPATIENT
Start: 2017-12-04

## 2017-12-04 RX ORDER — PREDNISONE 10 MG/1
TABLET ORAL
Qty: 60 TABLET | Refills: 0 | Status: SHIPPED | OUTPATIENT
Start: 2017-12-04

## 2017-12-04 RX ORDER — AMOXICILLIN AND CLAVULANATE POTASSIUM 875; 125 MG/1; MG/1
1 TABLET, FILM COATED ORAL 2 TIMES DAILY
Qty: 14 TABLET | Refills: 0 | Status: SHIPPED | OUTPATIENT
Start: 2017-12-04 | End: 2017-12-11

## 2017-12-04 NOTE — PROGRESS NOTES
[facility]    Progress Note    Debradenise Downey Patient Status:  Inpatient    1940 MRN [id number]   Location [facility] 5SW/SE Attending Carlos Yarbrough,  [facility] Day # 5 PCP Bushra Cazares MD     Subjective:     Constitutional 02/03/2014   TP 7.0 02/03/2014   AST 31 11/03/2017   ALT 25 04/20/2017   ESRML 60 (H) 11/03/2017   CRP 7.5 (H) 11/03/2017   TROP 0.06 (HH) 11/30/2017                         Philip Clinton MD  12/4/2017

## 2017-12-04 NOTE — CM/SW NOTE
Referral placed to HME for home oxygen. Face to face and oxygen levels need to be obtained.  E will supply patients oxygen once these requirements are documented    [facility] [id number]

## 2017-12-04 NOTE — CM/SW NOTE
Son notified of patients discharge accepted to [facility] and transport time    Martin Mi [id number]

## 2017-12-04 NOTE — PLAN OF CARE
Problem: Patient/Family Goals  Goal: Patient/Family Long Term Goal  Patient's Long Term Goal: Breathe better and go home    Interventions:  - Duoneb tx as ordered,  - monitor O2 saturations and activity level  - Administer medications as ordered.   - See ad appropriate   Outcome: Progressing      Problem: CARDIOVASCULAR - ADULT  Goal: Maintains optimal cardiac output and hemodynamic stability  INTERVENTIONS:  - Monitor vital signs, rhythm, and trends  - Monitor for bleeding, hypotension and signs of decreased Assess and document skin integrity  - Monitor for areas of redness and/or skin breakdown  - Initiate interventions, skin care algorithm/standards of care as needed   Outcome: Progressing    Goal: Incision(s), wounds(s) or drain site(s) healing without S/S

## 2017-12-04 NOTE — SLP NOTE
SPEECH DAILY NOTE - INPATIENT    ASSESSMENT & PLAN   ASSESSMENT  Patient seen to monitor tolerance of PO diet and train compensatory strategies. Patient observed sitting upright in bed with trials of current diet.   Patient put more in his mouth without sw FOLLOW UP  Follow Up Needed: No  SLP Follow-up Date: 12/01/17  Number of Visits to Meet Established Goals: 2  Session: 1        If you have any questions, please contact   Lis Troy MA/YVON-SLP  Speech Language Pathologist  Shamika Enriquez

## 2017-12-04 NOTE — PROGRESS NOTES
Called report to [facility] at [facility], [facility] in [place] at [facility].

## 2017-12-04 NOTE — PHYSICAL THERAPY NOTE
PHYSICAL THERAPY TREATMENT NOTE - INPATIENT    Room Number: 796/372-U       Presenting Problem:  (Pneumomediastinum, Pulmonary fibrosis, H/O RA)    Problem List  Principal Problem:    Pneumonia of both lungs due to infectious organism, unspecified part of as approp. Per pt he may be able to live with his son at d/c. No family is present for session. D/c plans pending further acute management, progress with therapy , assist at d/c of family.    SW to work with pt and family on optimal d/c plannin How much help from another person does the patient currently need. ..   -   Moving to and from a bed to a chair (including a wheelchair)?: A Little   -   Need to walk in hospital room?: A Lot   -   Climbing 3-5 steps with a railing?: A Lot    AM-PAC Score to demonstrate independence with home activity/exercise instructions provided to patient in preparation for discharge.    Goal #5   Current Status  initiated    Goal #6     Goal #6  Current Status

## 2017-12-04 NOTE — CM/SW NOTE
CTL met with patient at bedside and spoke to son via phone for dc plans. Patient and son are in agreement for rehab and requesting a referral to the [facility] [place], operated by [facility] in [place]. FRANCISCO Thakkar notified to send referral in [employer].  DON screen requested   Refer

## 2017-12-04 NOTE — PROGRESS NOTES
Patients 02 sat on room air is 95% at rest. Patients 02 sat on room air ambulating  Is 87% and while ambulating on 2L 02 90-93%

## 2017-12-04 NOTE — PLAN OF CARE
Problem: SAFETY ADULT - FALL  Goal: Free from fall injury  INTERVENTIONS:  - Assess pt frequently for physical needs  - Identify cognitive and physical deficits and behaviors that affect risk of falls.   - Rock fall precautions as indicated by assessme Discharge      Problem: CARDIOVASCULAR - ADULT  Goal: Maintains optimal cardiac output and hemodynamic stability  INTERVENTIONS:  - Monitor vital signs, rhythm, and trends  - Monitor for bleeding, hypotension and signs of decreased cardiac output  - Evalua development  - Assess and document skin integrity  - Monitor for areas of redness and/or skin breakdown  - Initiate interventions, skin care algorithm/standards of care as needed   Outcome: Adequate for Discharge    Goal: Incision(s), wounds(s) or drain si

## 2017-12-04 NOTE — CM/SW NOTE
3:25pm Update- Pt has been accepted at the [facility] and a bed is available for today. [facility] ambulance with O2 has been arranged for today 12/4 at 5:30pm. RN to order early dinner tray.     Report [phone]    --  2:44pm-

## 2017-12-05 NOTE — DISCHARGE SUMMARY
[place] [facility]    Discharge Summary    Magda Moscoso Patient Status:  Inpatient    1940 MRN [id number]   Location [facility] 5SW/SE Attending No att. providers found   [address] Day # 5 PCP Brionna Anna MD     Date of Admission:  c/c/e  Neuro:   nonfocal      Reason for Admission:    Pneumomediastinum and pulmonary fibrosis.     HISTORY OF PRESENT ILLNESS:  The patient is a 49-year-old  male who has been having progressive cough and shortness of breath for the last 3 days. steroids as above  - cont tessalon pearls TID - rx for home  - tussionex prn     Leukocytosis. Improved.   Steroids contributing.     Mild acute on chronic renal failure with dehydration. Better.   Was given IVF.     Pulmonary cachexia     30-lb wt loss taking this medication, and follow the directions you see here. Take 4 tabs daily for 4 days. Then take 3 tabs daily for 4 days. Then take 2 tabs daily for 4 days. Then take 1 tab daily indefinitely.    Quantity:  60 tablet  Refills:  0        CONTINUE MD Miguel In 1 week. Specialty:  Internal Medicine  Contact information:  [address] [place] [address]  [phone]             Iveth SALDANA DO In 1 week.     Specialty:  PULMONARY DISEASES  Contact information:  [place]

## 2017-12-06 ENCOUNTER — HOSPITAL ENCOUNTER (EMERGENCY)
Facility: HOSPITAL | Age: 77
Discharge: HOME | End: 2017-12-06
Attending: EMERGENCY MEDICINE
Payer: MEDICARE

## 2017-12-06 VITALS
DIASTOLIC BLOOD PRESSURE: 77 MMHG | HEART RATE: 99 BPM | HEIGHT: 66 IN | SYSTOLIC BLOOD PRESSURE: 115 MMHG | OXYGEN SATURATION: 100 % | WEIGHT: 108 LBS | BODY MASS INDEX: 17.36 KG/M2 | TEMPERATURE: 97 F | RESPIRATION RATE: 20 BRPM

## 2017-12-06 DIAGNOSIS — J84.10 PULMONARY FIBROSIS (HCC): Primary | ICD-10-CM

## 2017-12-06 PROCEDURE — 85007 BL SMEAR W/DIFF WBC COUNT: CPT | Performed by: EMERGENCY MEDICINE

## 2017-12-06 PROCEDURE — 80048 BASIC METABOLIC PNL TOTAL CA: CPT | Performed by: EMERGENCY MEDICINE

## 2017-12-06 PROCEDURE — 36415 COLL VENOUS BLD VENIPUNCTURE: CPT

## 2017-12-06 PROCEDURE — 93005 ELECTROCARDIOGRAM TRACING: CPT

## 2017-12-06 PROCEDURE — 85025 COMPLETE CBC W/AUTO DIFF WBC: CPT | Performed by: EMERGENCY MEDICINE

## 2017-12-06 PROCEDURE — 36600 WITHDRAWAL OF ARTERIAL BLOOD: CPT | Performed by: EMERGENCY MEDICINE

## 2017-12-06 PROCEDURE — 82805 BLOOD GASES W/O2 SATURATION: CPT | Performed by: EMERGENCY MEDICINE

## 2017-12-06 PROCEDURE — 93010 ELECTROCARDIOGRAM REPORT: CPT | Performed by: EMERGENCY MEDICINE

## 2017-12-06 PROCEDURE — 99285 EMERGENCY DEPT VISIT HI MDM: CPT

## 2017-12-06 PROCEDURE — 85027 COMPLETE CBC AUTOMATED: CPT | Performed by: EMERGENCY MEDICINE

## 2017-12-07 NOTE — CM/SW NOTE
[facility], [address] [place] [address]   [phone]     Spoke with patient's Nurse Corinne Nicole. Per the RN in the AM patient has crackles in the lungs RR of 40. MD Vazquez ordered patient to be sent to ER for eval and return if appropriate.

## 2017-12-07 NOTE — ED NOTES
Patient is cleared for discharge and transfer to [place] [address] per MD. Discharge instructions reviewed with patient including when and how to follow up with healthcare providers and when to seek emergency care.  RN to RN report g

## 2017-12-07 NOTE — ED PROVIDER NOTES
Patient Seen in: [facility] AND [facility] Emergency Department    History   Patient presents with:  Shortness Of Breath    Stated Complaint: sob    HPI    Patient is a 77-year-old male who presents to the emergency department with a chief complaint of shortness except as noted above.     Physical Exam   ED Triage Vitals [12/06/17 2058]  BP: 122/67  Pulse: 106  Resp: 22  Temp: (!) 97.4 °F (36.3 °C)  Temp src: Oral  SpO2: 100 %  O2 Device: Nasal cannula    Current:/87   Pulse 104   Temp (!) 97.4 °F (36.3 °C) ( components within normal limits   CBC WITH DIFFERENTIAL WITH PLATELET    Narrative: The following orders were created for panel order CBC WITH DIFFERENTIAL WITH PLATELET.   Procedure                               Abnormality         Status

## 2017-12-08 ENCOUNTER — TELEPHONE (OUTPATIENT)
Dept: PULMONOLOGY | Facility: CLINIC | Age: 77
End: 2017-12-08

## 2017-12-08 NOTE — TELEPHONE ENCOUNTER
You may double book the patient for sometime next week. I prefer Tuesday or Thursday during the first half of the schedule.

## 2017-12-08 NOTE — TELEPHONE ENCOUNTER
GISELA Medina from [facility] MISHA MOREIRA calling for mutual pt. Requesting pt to be seen within 1 week for hosp f/up. Pls call at:[phone],thanks.   *Ask for any of the nurses

## 2017-12-12 ENCOUNTER — OFFICE VISIT (OUTPATIENT)
Dept: PULMONOLOGY | Facility: CLINIC | Age: 77
End: 2017-12-12

## 2017-12-12 VITALS
HEIGHT: 66 IN | HEART RATE: 116 BPM | OXYGEN SATURATION: 100 % | DIASTOLIC BLOOD PRESSURE: 63 MMHG | WEIGHT: 112.38 LBS | BODY MASS INDEX: 18.06 KG/M2 | SYSTOLIC BLOOD PRESSURE: 107 MMHG

## 2017-12-12 DIAGNOSIS — J98.4 LUNG DISEASE, RESTRICTIVE: ICD-10-CM

## 2017-12-12 DIAGNOSIS — J84.10 PULMONARY FIBROSIS (HCC): Primary | ICD-10-CM

## 2017-12-12 DIAGNOSIS — R09.02 HYPOXEMIA: ICD-10-CM

## 2017-12-12 PROCEDURE — 99213 OFFICE O/P EST LOW 20 MIN: CPT | Performed by: INTERNAL MEDICINE

## 2017-12-12 PROCEDURE — G0463 HOSPITAL OUTPT CLINIC VISIT: HCPCS | Performed by: INTERNAL MEDICINE

## 2017-12-12 NOTE — PROGRESS NOTES
Referring Physician  Parul Jiang MD    History of Present Illness  Patient is a 68-year-old  male who presents to pulmonary clinic for follow-up visit after recent hospitalization. Patient has been set up with 2 L home oxygen.   He admits to gener facility-administered medications on file prior to visit.      Allergies  No Known Allergies    Physical Exam  Constitutional: no acute distress, alert, oriented  HEENT: PERRL, EOMI, nares patents, no nasal polyps   Cardio: RRR, S1 S2  Respiratory:  bibasil

## 2017-12-15 ENCOUNTER — TELEPHONE (OUTPATIENT)
Dept: PULMONOLOGY | Facility: CLINIC | Age: 77
End: 2017-12-15

## 2017-12-15 ENCOUNTER — HOSPITAL ENCOUNTER (EMERGENCY)
Facility: HOSPITAL | Age: 77
Discharge: STILL ADMITTED OR ADMINISTRATIVE | End: 2017-12-15
Payer: MEDICARE

## 2017-12-15 NOTE — TELEPHONE ENCOUNTER
Madyson/Mallory called to find out if DME form was received for pts oxygen. She will also fax form again. Please call to confirm it was received.

## 2017-12-18 NOTE — TELEPHONE ENCOUNTER
Order has been left in Dr. Anh Mata folder for review. Matthew Williamson notified Dr. Torres Hunter will be back in the office tomorrow to review order.

## 2017-12-19 ENCOUNTER — TELEPHONE (OUTPATIENT)
Dept: PULMONOLOGY | Facility: CLINIC | Age: 77
End: 2017-12-19

## 2017-12-19 NOTE — TELEPHONE ENCOUNTER
[facility] [facility] calling for mutual pt requesting hosp f/up appt to be seen within a week. Pls ask for the nurse on duty at:[phone],thanks.

## 2017-12-19 NOTE — TELEPHONE ENCOUNTER
CMN signed by Dr. Mack Otero and faxed to [place] at [facility] [phone]. Fax failed multiple times.

## 2017-12-21 ENCOUNTER — TELEPHONE (OUTPATIENT)
Dept: FAMILY MEDICINE CLINIC | Facility: CLINIC | Age: 77
End: 2017-12-21

## 2017-12-21 NOTE — TELEPHONE ENCOUNTER
Erik/pt's son requesting callback to review pt's medications (HIPPA on file)     States pt currently at [facility] and pt is very concerned he is not getting correct medications as previously prescribed by pcp.      Son concerned pt does not have pcp sinc

## 2017-12-22 NOTE — TELEPHONE ENCOUNTER
Pt already sched for appt on 1/16 @ 11:10 am at [facility]. Rickey Mcintosh given appt time, location, & parking. He verbalized understanding.

## 2017-12-22 NOTE — TELEPHONE ENCOUNTER
Spoke to nurse Latesha(?) at [facility] who states she remembers the pt was on valcyclovir before, but at some point either when he was taken to [address] [place] [address], the med must have been discontinued.     Advised pt's son that valtrex is not usually prescr

## 2018-01-10 ENCOUNTER — TELEPHONE (OUTPATIENT)
Dept: FAMILY MEDICINE CLINIC | Facility: CLINIC | Age: 78
End: 2018-01-10

## 2018-01-10 NOTE — TELEPHONE ENCOUNTER
Pt was hospitalized in December due Admitting Diagnosis: Pulmonary fibrosis ([facility] [J84.10]  Pneumomediastinum ([facility] [J98.2]  Pneumonia of both lungs due to infectious organism, unspecified part of lung [J18.9]    He is currently at the [facility]

## 2018-01-11 ENCOUNTER — TELEPHONE (OUTPATIENT)
Dept: PULMONOLOGY | Facility: CLINIC | Age: 78
End: 2018-01-11

## 2018-01-11 NOTE — TELEPHONE ENCOUNTER
I would suggest Dr. Magnus Lima, Jovanna Ann, or Saul at [facility]. Please let him know. Thanks.

## 2018-01-11 NOTE — TELEPHONE ENCOUNTER
Left detailed message for son regarding a home visiting provider.    [address] [place] [address]  [phone]

## 2018-01-18 ENCOUNTER — TELEPHONE (OUTPATIENT)
Dept: CARDIOLOGY CLINIC | Facility: CLINIC | Age: 78
End: 2018-01-18

## 2018-01-18 NOTE — TELEPHONE ENCOUNTER
FYI - pt contracted a lung infection a in December - was in [facility] was a few days. Since then, pt has bed bound for 6 weeks  Home physician will be visiting patient tomorrow. Probably won't be following up with you until spring or so.

## 2023-08-09 NOTE — ED NOTES
Patient with history of pulmonary fibrosis, states his breathing has been bad for years, but in the last few weeks has gotten much worse. Is supposed to see his pulmonologist tomorrow.  States he also has tightness in his chest.
Pt placed on 2 lpm via NC.  Continues to have increased RR at 50, but oxygen increasing at this time to 98%
No

## 2025-02-09 NOTE — IP AVS SNAPSHOT
Problem: Adult Inpatient Plan of Care  Goal: Plan of Care Review  Outcome: Progressing  Goal: Patient-Specific Goal (Individualized)  Outcome: Progressing  Goal: Absence of Hospital-Acquired Illness or Injury  Outcome: Progressing  Goal: Optimal Comfort and Wellbeing  Outcome: Progressing  Goal: Readiness for Transition of Care  Outcome: Progressing      [facility]            (For Outpatient Use Only) Initial Admit Date: 11/29/2017   Inpt/Obs Admit Date: Inpt: 11/29/17 / Obs: N/A   Discharge Date:    Amado Ledbetter:  [de-identified]   MRN: [de-identified]   CSN: [id number]        ENCOUNTER  Patient Cla

## (undated) NOTE — MR AVS SNAPSHOT
[facility]  [address], [address]  [phone]               Thank you for choosing us for your health care visit with Joyce Muñoz MD.  We are glad to serve you and happy to provide you with this summa Take 1 tablet by mouth daily. * Fluticasone Furoate-Vilanterol 100-25 MCG/INH Aepb   Inhale 1 puff into the lungs daily.    Commonly known as:  BREO ELLIPTA           * Fluticasone Furoate-Vilanterol 100-25 MCG/INH Aepb   Inhale 1 puff into the mary You can get these medications from any pharmacy     Bring a paper prescription for each of these medications    - HYDROcodone-acetaminophen 5-325 MG Tabs            [employer]     Call the [employer] for assistance with your inactive [employer] account    If you h

## (undated) NOTE — MR AVS SNAPSHOT
[facility]  [address], [address]  [phone]               Thank you for choosing us for your health care visit with Adeel Zheng MD.  We are glad to serve you and happy to provide you with this summa This list is accurate as of: 1/13/17 11:18 AM.  Always use your most recent med list.                Albuterol Sulfate  (90 BASE) MCG/ACT Aers   Inhale 2 puffs into the lungs every 6 (six) hours as needed for Wheezing.            aspirin 325 MG Tabs Bring a paper prescription for each of these medications    - HYDROcodone-acetaminophen 5-325 MG Tabs            Today's Orders     Albumin Serum  (Every 3 months)   Expires on:  Jan 13, 2018    Assoc Dx:  Rheumatoid arthritis of multiple sites with negat

## (undated) NOTE — Clinical Note
1/10/2017              [address]       Dear Zach Gibson MD,    Thank you for allowing me to participate in your patient's care.  We appreciate your confidence in their care for your pa

## (undated) NOTE — MR AVS SNAPSHOT
[facility] [place] [facility] [place] [address] [address] [address] [phone]               Thank you for choosing us for your health care visit with Jaymie Sow MD.  We are glad to serve you and happy to provide you with this summary o BIOTIN 5000 5 MG Caps   Generic drug:  Biotin   Take 1 tablet by mouth daily. CALCIUM 500 OR   Take 1 tablet by mouth daily. cefdinir 300 MG Caps   Take 1 capsule (300 mg total) by mouth 2 (two) times daily.    Commonly known as:  OMNIC AST (SGOT) [E]    Complete by:   Mar 21, 2017 (Approximate)    Assoc Dx:  Coronary artery disease involving native coronary artery of native heart without angina pectoris [I25.10]                 Follow-up Instructions     Return in about 1 year (around 2/

## (undated) NOTE — MR AVS SNAPSHOT
[facility] [address]  [phone]               Thank you for choosing us for your health care visit with Rose Acevedo DPM.  We are glad to serve you and happy to provide you with this summary of yo Take 1 capsule (300 mg total) by mouth 2 (two) times daily. Commonly known as:  OMNICEF           Fluticasone Furoate-Vilanterol 100-25 MCG/INH Aepb   Inhale 1 puff into the lungs daily.    Commonly known as:  BREO ELLIPTA           folic acid 489 MCG Tab

## (undated) NOTE — IP AVS SNAPSHOT
Patient Demographics     Address  [address] [place] Phone  [phone] (Home) *Preferred*      Emergency Contact(s)     Name Relation Home Work Mobile    Hanna Sanderson [phone]        Allergies as of 12/4/2017  Reviewed Next dose due:  Anytime as needed      Take 1 tablet by mouth every 6 (six) hours as needed for Pain.    Jameson Hamilton MD         Hydroxychloroquine Sulfate 200 MG Tabs  Commonly known as:  PLAQUENIL  Next dose due:  12/4/17 evening      Take 1 tablet (20 100 mL ADD-vantage 12/03/17 2051 New Bag      [id number] Piperacillin Sod-Tazobactam So (ZOSYN) 3.375 g in dextrose 5 % 100 mL ADD-vantage 12/04/17 0424 New Bag      [id number] Thiamine HCl tab 100 mg 12/04/17 1110 Given      [id number] benzonatate (TESSALON) Potassium 4.9 3.3 - 5.1 mmol/L — [facility]   Chloride 99 95 - 110 mmol/L — [facility]   CO2 32 22 - 32 mmol/L — [facility]   BUN 37 8 - 20 mg/dL [facility]   Creatinine 1.16 0.50 - 1.50 mg/dL [facility]   Calcium, Total 8.7 8.5 - 10.5 mg/dL Specimen:  Blood from Blood,peripheral      Blood Culture Result No Growth 4 Days    Blood Culture FREQ X 2 [[id number]] Collected:  11/29/17 2129    Order Status:  Completed Lab Status:  Preliminary result Updated:  12/03/17 2300    Specimen:  Blood from superimposed upon chronic fibrotic lung disease. CT scan of the chest was obtained which showed extensive pneumomediastinum with subcutaneous emphysema extending into the visualized lower neck bilaterally, exact etiology is unclear.   There is extensive in HEENT:  Atraumatic. Oropharynx clear. Dry mucous membranes. Eyes:  Anicteric sclerae. Pupils equal, round, reactive to light and accommodation. Extraocular muscle movements intact. NECK:  Supple. No lymphadenopathy. Trachea midline.   Full range of Addendum    :  Patel Alan MD (Physician)    Related Notes:  Original Note by Patel Alan MD (Physician) filed at 11/29/2017 11:48 PM     Consult Orders:    1.  IP consult to Pulmonology Once [[id number]] ordered by Samantha Avendano MD at [facility] • Displacement of intervertebral disc 1980    PER NG LAMINECTOMY   • High cholesterol    • Hypersensitivity angiitis, unspecified    • Impotence of organic origin    • Other and unspecified hyperlipidemia    • Polyarthritis    • RA (rheumatoid arthritis) ( HYDROcodone-acetaminophen 5-325 MG Oral Tab Take 1 tablet by mouth every 6 (six) hours as needed for Pain. HYDROcodone-acetaminophen 5-325 MG Oral Tab Take 1 tablet by mouth every 6 (six) hours as needed for Pain.    methotrexate 2.5 MG Oral Tab TAKE [facility] Awake alert oriented ×3 with no focal deficit    Results:     Laboratory Data:    Lab Results  Component Value Date   WBC 16.5 (H) 11/29/2017   HGB 10.1 (L) 11/29/2017   HCT 31.6 (L) 11/29/2017    11/29/2017   CREATSERUM 1.94 (H) 11/29/2017   BUN 47 dissecting into the right and left neck. Correlate clinically and with CT scanning. No large pleural effusion.               Impression:     1–pneumomediastinum  Secondary to significant dry cough with underlying severe fibrotic lung  Supportive care  Carlitos Occupational Therapy Notes (last 72 hours) (Notes from 12/1/2017  4:40 PM through 12/4/2017  4:40 PM)      Occupational Therapy Note signed by Richard Lubin OT at 12/3/2017  4:45 PM  Version 1 of 1    Author:  Richard Lubin OT Service:  (none) Auth dressing techniques. Use of rw strongly encouraged. At end of session pt returned to bed and left w/ all needs in reach and bed alarm on.  RN aware of pt's status and recommendation for continued IP rehab, or at the least 24 hour assist if pt is to return h Toilet and Equipment: Toilet riser;Grab bar  Shower/Tub and Equipment: Tub-shower combo     Drives: Yes  Patient Regularly Uses:  (Independent with amb without AD)[AYANNA.2]    Stairs in Home: 2 flights to enter his apartment  Use of Assistive Device(s): none Dynamic Sitting: supervision  Static Standing: cga w/ rw  Dynamic Standing: cga w/ rw      FUNCTIONAL ADL ASSESSMENT  Grooming: independent w/ increased time  Feeding: independent  Bathing: mod a  Toileting: max a juan manuel care  Upper Extremity Dressing: min a every presentation. When the patient did this swallows were timely with adequate laryngeal excursion. No clinical signs of aspiration were noted with any consistency. Will sign off due to functional swallow.      Diet Recommendations - Solids: Mechanical Attribution Cameron    EV. 1 - Jt Kendrick, SLP on 12/4/2017  3:25 PM                     Immunizations     Name Date      INFLUENZA 12/22/15     INFLUENZA 11/13/14     INFLUENZA 11/26/10       Future Appointments        Provider Nette Medrano

## (undated) NOTE — MR AVS SNAPSHOT
[address]  [phone]               Thank you for choosing us for your health care visit with [facility] ALECDO LARISA. We are glad to serve you and happy to provide you with this summary of your visit. Commonly known as:  OMNICEF           * Fluticasone Furoate-Vilanterol 100-25 MCG/INH Aepb   Inhale 1 puff into the lungs daily. What changed:  Another medication with the same name was added. Make sure you understand how and when to take each.    Commonl These medications were sent to [facility] [address], [address] [facility] [phone], [phone]  [address] [place] [address]     Phone:  [phone]    - Fluticasone [facility]ter[facility]

## (undated) NOTE — Clinical Note
1/17/2017        Dear Hay Mixon MD,    Thank you for allowing me to participate in your patient's care. We appreciate your confidence in their care for your patient.     The patient will find the results of our examination and our treatment recomm

## (undated) NOTE — MR AVS SNAPSHOT
[facility] [address]  [phone]               Thank you for choosing us for your health care visit with Eun Barr DPM.  We are glad to serve you and happy to provide you with this summary of yo Commonly known as:  BREO ELLIPTA           folic acid 363 MCG Tabs   Take 800 mcg by mouth daily. Commonly known as:  FOLVITE           HYDROcodone-acetaminophen 5-325 MG Tabs   Take 1 tablet by mouth every 6 (six) hours as needed for Pain.    Commonly kn

## (undated) NOTE — MR AVS SNAPSHOT
[facility]  [address], [address]  [phone]               Thank you for choosing us for your health care visit with Carlos Enrique Rogers MD.  We are glad to serve you and happy to provide you with this summar Take 325 mg by mouth daily. BIOTIN 5000 5 MG Caps   Generic drug:  Biotin   Take 1 tablet by mouth daily. CALCIUM 500 OR   Take 1 tablet by mouth daily.            * Fluticasone Furoate-Vilanterol 100-25 MCG/INH Aepb   Inhale 1 puff into LESLI Solorio [phone], [phone]  [address]     Phone:  [phone]    - Amoxicillin-Pot Clavulanate 875-125 MG Tabs            MyChart     Call the [facility] for assistance with your inactive MyChart account